# Patient Record
Sex: MALE | Race: WHITE | NOT HISPANIC OR LATINO | Employment: FULL TIME | URBAN - NONMETROPOLITAN AREA
[De-identification: names, ages, dates, MRNs, and addresses within clinical notes are randomized per-mention and may not be internally consistent; named-entity substitution may affect disease eponyms.]

---

## 2023-11-28 ENCOUNTER — OFFICE VISIT (OUTPATIENT)
Dept: URGENT CARE | Facility: CLINIC | Age: 38
End: 2023-11-28
Payer: COMMERCIAL

## 2023-11-28 VITALS
TEMPERATURE: 98.2 F | OXYGEN SATURATION: 96 % | WEIGHT: 248.4 LBS | DIASTOLIC BLOOD PRESSURE: 84 MMHG | BODY MASS INDEX: 35.56 KG/M2 | HEIGHT: 70 IN | RESPIRATION RATE: 20 BRPM | SYSTOLIC BLOOD PRESSURE: 114 MMHG | HEART RATE: 88 BPM

## 2023-11-28 DIAGNOSIS — J02.9 SORE THROAT: Primary | ICD-10-CM

## 2023-11-28 LAB — S PYO AG THROAT QL: NEGATIVE

## 2023-11-28 PROCEDURE — 99203 OFFICE O/P NEW LOW 30 MIN: CPT

## 2023-11-28 PROCEDURE — 87070 CULTURE OTHR SPECIMN AEROBIC: CPT

## 2023-11-28 PROCEDURE — 87880 STREP A ASSAY W/OPTIC: CPT

## 2023-11-28 NOTE — PROGRESS NOTES
[facility]        NAME: Richard Garcia is a 45 y.o. male  : 1985    MRN: [id number]  DATE: 2023  TIME: 4:41 PM    Assessment and Plan   Sore throat [J02.9]  1. Sore throat  POCT rapid strepA    Throat culture        Rapid strep negative. Posterior pharyngeal erythema is very minimal-no tonsillar exudates. No cervical lymphadenopathy. May be due to to recent congestion/coughing up mucus. Given recommendations for at home remedies to help with sore throat. Will send for throat culture. Advised follow-up with family doctor if no improvement. Advised to go to the ER if any symptoms worsen. Patient Instructions     Tylenol/ibuprofen for pain or fever. Make sure to stay well-hydrated and rest.  May try warm tea with honey, teaspoon of honey, throat lozenges, warm salt gargles, Chloraseptic spray to help with sore throat symptoms. If postnasal drip occurs-May try nasal saline rinses and Flonase over-the-counter. If there is severe congestion-may try Afrin, but do not use for longer than 3 days. If no improvement, follow-up with family doctor. Go to the emergency room if any symptoms worsen. Follow up with PCP in 3-5 days. Proceed to  ER if symptoms worsen. Chief Complaint     Chief Complaint   Patient presents with    Sore Throat     Sore throat, feels like swallowing glass. Symptoms started yesterday. Aleve D & mucinex D taken for symptoms. History of Present Illness       69-year-old male presents to the clinic with sore throat that began early yesterday morning. PT states he feels like he is "swallowing glass". States that it in the morning and at night but improves throughout the day. He denies any current congestion or runny nose, but did state that he was coughing up some mucus earlier today. Denies any current cough. Denies sick contacts. Denies any fever, chills, earache, chest pain, shortness of breath.         Review of Systems   Review of Systems Constitutional: Negative. HENT:  Positive for sore throat. Negative for congestion, ear discharge, ear pain, postnasal drip and rhinorrhea. Eyes: Negative. Respiratory:  Positive for cough (improved). Cardiovascular: Negative. Gastrointestinal: Negative. Neurological: Negative. Current Medications     No current outpatient medications on file. Current Allergies     Allergies as of 11/28/2023    (No Known Allergies)            The following portions of the patient's history were reviewed and updated as appropriate: allergies, current medications, past family history, past medical history, past social history, past surgical history and problem list.     History reviewed. No pertinent past medical history. History reviewed. No pertinent surgical history. No family history on file. Medications have been verified. Objective   /84   Pulse 88   Temp 98.2 °F (36.8 °C)   Resp 20   Ht 5' 10" (1.778 m)   Wt 113 kg (248 lb 6.4 oz)   SpO2 96%   BMI 35.64 kg/m²        Physical Exam     Physical Exam  Constitutional:       General: He is not in acute distress. Appearance: Normal appearance. He is not ill-appearing or diaphoretic. HENT:      Head: Normocephalic and atraumatic. Right Ear: Tympanic membrane, ear canal and external ear normal.      Left Ear: Tympanic membrane, ear canal and external ear normal.      Nose: Nose normal.      Mouth/Throat:      Lips: Pink. Mouth: Mucous membranes are moist.      Pharynx: Oropharynx is clear. Uvula midline. Posterior oropharyngeal erythema (mild) present. No pharyngeal swelling, oropharyngeal exudate or uvula swelling. Tonsils: No tonsillar exudate or tonsillar abscesses. 1+ on the right. 1+ on the left. Eyes:      Extraocular Movements: Extraocular movements intact. Conjunctiva/sclera: Conjunctivae normal.      Pupils: Pupils are equal, round, and reactive to light.    Cardiovascular:      Rate and Rhythm: Normal rate and regular rhythm. Pulses: Normal pulses. Heart sounds: Normal heart sounds. Pulmonary:      Effort: Pulmonary effort is normal. No respiratory distress. Breath sounds: Normal breath sounds. Musculoskeletal:      Cervical back: Normal range of motion and neck supple. Lymphadenopathy:      Cervical: No cervical adenopathy. Skin:     General: Skin is warm and dry. Capillary Refill: Capillary refill takes less than 2 seconds. Findings: No rash. Neurological:      General: No focal deficit present. Mental Status: He is alert and oriented to person, place, and time.    Psychiatric:         Mood and Affect: Mood normal.

## 2023-11-28 NOTE — PATIENT INSTRUCTIONS
Tylenol/ibuprofen for pain or fever. Make sure to stay well-hydrated and rest.  May try warm tea with honey, teaspoon of honey, throat lozenges, warm salt gargles, Chloraseptic spray to help with sore throat symptoms. If postnasal drip occurs-May try nasal saline rinses and Flonase over-the-counter. If there is severe congestion-may try Afrin, but do not use for longer than 3 days. If no improvement, follow-up with family doctor. Go to the emergency room if any symptoms worsen. Follow up with PCP in 3-5 days. Proceed to  ER if symptoms worsen. Pharyngitis   WHAT YOU NEED TO KNOW:   Pharyngitis, or sore throat, is inflammation of the tissues and structures in your pharynx (throat). Pharyngitis is most often caused by bacteria or a virus. Other causes include smoking, allergies, or acid reflux. DISCHARGE INSTRUCTIONS:   Call your local emergency number (911 in the [address]) if:   You have trouble breathing or swallowing because your throat is swollen. Return to the emergency department if:   You are drooling because it hurts too much to swallow. Your fever is higher than 102? F (39?C) or lasts longer than 3 days. You are confused. You taste blood. Call your doctor if:   Your throat pain gets worse. You have a painful lump in your throat that does not go away after 5 days. Your symptoms do not improve after 5 days. You have questions or concerns about your condition or care. Medicines:  Viral pharyngitis will go away on its own without treatment. Your sore throat should start to feel better in 3 to 5 days. You may need any of the following:  Antibiotics  treat a bacterial infection. NSAIDs  help decrease swelling and pain or fever. This medicine is available with or without a doctor's order. NSAIDs can cause stomach bleeding or kidney problems in certain people. If you take blood thinner medicine, always ask your healthcare provider if NSAIDs are safe for you.  Always read the medicine label and follow directions. Acetaminophen  decreases pain and fever. It is available without a doctor's order. Ask how much to take and how often to take it. Follow directions. Read the labels of all other medicines you are using to see if they also contain acetaminophen, or ask your doctor or pharmacist. Acetaminophen can cause liver damage if not taken correctly. Take your medicine as directed. Contact your healthcare provider if you think your medicine is not helping or if you have side effects. Tell your provider if you are allergic to any medicine. Keep a list of the medicines, vitamins, and herbs you take. Include the amounts, and when and why you take them. Bring the list or the pill bottles to follow-up visits. Carry your medicine list with you in case of an emergency. Manage your symptoms:   Gargle salt water. Mix ¼ teaspoon salt in an 8 ounce glass of warm water and gargle. Do not swallow. Salt water may help decrease swelling in your throat. Drink liquids as directed. You may need to drink more liquids than usual. Liquids may help soothe your throat and prevent dehydration. Ask how much liquid to drink each day and which liquids are best for you. Use a cool mist humidifier. This will add moisture to the air and help decrease your cough. Soothe your throat. Cough drops, ice, soft foods, or popsicles may help decrease throat pain. Prevent the spread of pharyngitis:  Cover your mouth and nose when you cough or sneeze. Do not share food or drinks. Wash your hands often. Use soap and water. If soap and water are unavailable, use an alcohol-based hand . Follow up with your doctor as directed:  Write down your questions so you remember to ask them during your visits. © Copyright [employer] Martínez 2023 Information is for End User's use only and may not be sold, redistributed or otherwise used for commercial purposes. The above information is an  only.  It is not intended as medical advice for individual conditions or treatments. Talk to your doctor, nurse or pharmacist before following any medical regimen to see if it is safe and effective for you.

## 2023-11-30 LAB — BACTERIA THROAT CULT: NORMAL
